# Patient Record
Sex: FEMALE | Race: BLACK OR AFRICAN AMERICAN | URBAN - METROPOLITAN AREA
[De-identification: names, ages, dates, MRNs, and addresses within clinical notes are randomized per-mention and may not be internally consistent; named-entity substitution may affect disease eponyms.]

---

## 2024-11-26 ENCOUNTER — OFFICE VISIT (OUTPATIENT)
Dept: URBAN - METROPOLITAN AREA CLINIC 118 | Facility: CLINIC | Age: 34
End: 2024-11-26
Payer: MEDICAID

## 2024-11-26 ENCOUNTER — LAB OUTSIDE AN ENCOUNTER (OUTPATIENT)
Dept: URBAN - METROPOLITAN AREA CLINIC 118 | Facility: CLINIC | Age: 34
End: 2024-11-26

## 2024-11-26 ENCOUNTER — DASHBOARD ENCOUNTERS (OUTPATIENT)
Age: 34
End: 2024-11-26

## 2024-11-26 VITALS
SYSTOLIC BLOOD PRESSURE: 127 MMHG | TEMPERATURE: 97.3 F | DIASTOLIC BLOOD PRESSURE: 79 MMHG | WEIGHT: 214.8 LBS | HEART RATE: 72 BPM | HEIGHT: 66 IN | BODY MASS INDEX: 34.52 KG/M2

## 2024-11-26 DIAGNOSIS — Z91.018 FOOD ALLERGY: ICD-10-CM

## 2024-11-26 DIAGNOSIS — K21.9 GERD WITHOUT ESOPHAGITIS: ICD-10-CM

## 2024-11-26 PROBLEM — 266435005: Status: ACTIVE | Noted: 2024-11-26

## 2024-11-26 PROBLEM — 414285001: Status: ACTIVE | Noted: 2024-11-26

## 2024-11-26 PROCEDURE — 99204 OFFICE O/P NEW MOD 45 MIN: CPT | Performed by: INTERNAL MEDICINE

## 2024-11-26 RX ORDER — ATORVASTATIN CALCIUM 20 MG/1
1 TABLET TABLET, FILM COATED ORAL ONCE A DAY
Status: ACTIVE | COMMUNITY

## 2024-11-26 RX ORDER — PANTOPRAZOLE SODIUM 40 MG/1
1 TABLET TABLET, DELAYED RELEASE ORAL ONCE A DAY
Qty: 30 TABLET | Refills: 5 | OUTPATIENT
Start: 2024-11-26

## 2024-11-26 RX ORDER — AMLODIPINE BESYLATE 5 MG/1
1 TABLET TABLET ORAL ONCE A DAY
Status: ACTIVE | COMMUNITY

## 2024-11-26 NOTE — HPI-TODAY'S VISIT:
pt h/o chronic gerd presents for evaluation. pt reports after food allergy reaction months ago developed progressively worsening gerd despite food eaten. pt reports noted nausea occasionally with symptoms, denies dysphagia. pt denies other diet or med changes. pt concerned for food allergies. No nsaids/asa. No recent weight loss or anemia. No LGI symptoms. pt seen by pmd, started on Famotidine for >2 months without relief, referred to GI. Of note pt diagnosed thyroid cancer s/p thyroidectomy earlier this year.

## 2024-11-27 LAB
ALMOND (F20) IGE: 0.22
CASHEW NUT (F202) IGE: 0.12
CLASS: (no result)
CLASS: 0
CLASS: 1
CLASS: 1
CODFISH (F3) IGE: <0.1
COW'S MILK (F2) IGE: <0.1
EGG WHITE (F1) IGE: <0.1
HAZELNUT (F17) IGE: 0.16
IMMUNOGLOBULIN A: 117
INTERPRETATION: (no result)
INTERPRETATION: (no result)
PEANUT (F13) IGE: 0.37
SALMON (F41) IGE: <0.1
SCALLOP (F338) IGE: 0.13
SESAME SEED (F10) IGE: 0.38
SHRIMP (F24) IGE: <0.1
SOYBEAN (F14) IGE: 0.18
TISSUE TRANSGLUTAMINASE AB, IGA: <1
TUNA (F40) IGE: <0.1
WALNUT (F256) IGE: 0.2
WHEAT (F4) IGE: 0.34

## 2025-01-10 ENCOUNTER — OFFICE VISIT (OUTPATIENT)
Dept: URBAN - METROPOLITAN AREA SURGERY CENTER 23 | Facility: SURGERY CENTER | Age: 35
End: 2025-01-10